# Patient Record
Sex: MALE | Race: WHITE | NOT HISPANIC OR LATINO
[De-identification: names, ages, dates, MRNs, and addresses within clinical notes are randomized per-mention and may not be internally consistent; named-entity substitution may affect disease eponyms.]

---

## 2023-12-19 ENCOUNTER — DASHBOARD ENCOUNTERS (OUTPATIENT)
Age: 48
End: 2023-12-19

## 2023-12-19 ENCOUNTER — OFFICE VISIT (OUTPATIENT)
Dept: URBAN - METROPOLITAN AREA CLINIC 44 | Facility: CLINIC | Age: 48
End: 2023-12-19

## 2023-12-19 ENCOUNTER — CLAIMS CREATED FROM THE CLAIM WINDOW (OUTPATIENT)
Dept: URBAN - METROPOLITAN AREA CLINIC 44 | Facility: CLINIC | Age: 48
End: 2023-12-19

## 2023-12-19 ENCOUNTER — LAB OUTSIDE AN ENCOUNTER (OUTPATIENT)
Dept: URBAN - METROPOLITAN AREA CLINIC 44 | Facility: CLINIC | Age: 48
End: 2023-12-19

## 2023-12-19 VITALS
BODY MASS INDEX: 33.99 KG/M2 | HEART RATE: 69 BPM | WEIGHT: 237.4 LBS | TEMPERATURE: 97.7 F | DIASTOLIC BLOOD PRESSURE: 79 MMHG | SYSTOLIC BLOOD PRESSURE: 123 MMHG | HEIGHT: 70 IN

## 2023-12-19 DIAGNOSIS — K76.0 FATTY LIVER: ICD-10-CM

## 2023-12-19 DIAGNOSIS — K82.4 GALLBLADDER POLYP: ICD-10-CM

## 2023-12-19 DIAGNOSIS — Z12.11 COLON CANCER SCREENING: ICD-10-CM

## 2023-12-19 PROBLEM — 197321007: Status: ACTIVE | Noted: 2023-12-19

## 2023-12-19 PROCEDURE — 99204 OFFICE O/P NEW MOD 45 MIN: CPT | Performed by: INTERNAL MEDICINE

## 2023-12-19 RX ORDER — LEVOTHYROXINE SODIUM 175 UG/1
1 TABLET IN THE MORNING ON AN EMPTY STOMACH TABLET ORAL ONCE A DAY
Status: ACTIVE | COMMUNITY

## 2023-12-19 RX ORDER — ICOSAPENT ETHYL 1000 MG/1
2 CAPSULES WITH MEALS CAPSULE ORAL TWICE A DAY
Status: ACTIVE | COMMUNITY

## 2023-12-19 RX ORDER — FENOFIBRATE 145 MG/1
1 TABLET TABLET ORAL ONCE A DAY
Qty: 90 TABLET | Status: ACTIVE | COMMUNITY

## 2023-12-19 NOTE — HPI-TODAY'S VISIT:
49 yo M presents for an OV for evaluation of an abnormal US that revealed a gallbladder polyp. He had an US due to elevated liver enzyme levels. He mentions a history of LFT elevation and fatty liver disease. He has risk factors of obesity and hyperlipidemia.  He was told he had Hep. C but states that this was later deemed a false positive.  Labs from 9/23: AST 52(nl 40), (46), normal Alk phos and T bilirubin.  Labs from 1/2023 with normal LFTs.  Denies fever, chills or recent infection.  History of COVID-19 on 2/2021 Medication reconciliation: Fenofibrate The patient also has not had a colonoscopy. Denies familial GI disease. Mentions mild changes in bowel habits that are loose but still formed.

## 2024-01-18 ENCOUNTER — TELEPHONE ENCOUNTER (OUTPATIENT)
Dept: URBAN - METROPOLITAN AREA CLINIC 44 | Facility: CLINIC | Age: 49
End: 2024-01-18

## 2024-02-08 ENCOUNTER — COLON (OUTPATIENT)
Dept: URBAN - METROPOLITAN AREA SURGERY CENTER 27 | Facility: SURGERY CENTER | Age: 49
End: 2024-02-08

## 2024-10-18 ENCOUNTER — TELEPHONE ENCOUNTER (OUTPATIENT)
Dept: URBAN - METROPOLITAN AREA CLINIC 44 | Facility: CLINIC | Age: 49
End: 2024-10-18

## 2024-10-18 PROBLEM — 197321007: Status: ACTIVE | Noted: 2024-10-18

## 2024-10-18 PROBLEM — 197433003: Status: ACTIVE | Noted: 2024-10-18

## 2024-10-21 ENCOUNTER — LAB OUTSIDE AN ENCOUNTER (OUTPATIENT)
Dept: URBAN - METROPOLITAN AREA CLINIC 48 | Facility: CLINIC | Age: 49
End: 2024-10-21

## 2024-10-21 ENCOUNTER — OFFICE VISIT (OUTPATIENT)
Dept: URBAN - METROPOLITAN AREA CLINIC 48 | Facility: CLINIC | Age: 49
End: 2024-10-21
Payer: COMMERCIAL

## 2024-10-21 VITALS
WEIGHT: 220.4 LBS | DIASTOLIC BLOOD PRESSURE: 74 MMHG | SYSTOLIC BLOOD PRESSURE: 123 MMHG | TEMPERATURE: 97.3 F | BODY MASS INDEX: 31.55 KG/M2 | HEART RATE: 66 BPM | HEIGHT: 70 IN

## 2024-10-21 DIAGNOSIS — Z12.11 SCREENING FOR COLON CANCER: ICD-10-CM

## 2024-10-21 DIAGNOSIS — K76.0 HEPATIC STEATOSIS: ICD-10-CM

## 2024-10-21 DIAGNOSIS — K82.4 GALLBLADDER POLYP: ICD-10-CM

## 2024-10-21 DIAGNOSIS — K64.8 OTHER HEMORRHOIDS: ICD-10-CM

## 2024-10-21 PROCEDURE — 99204 OFFICE O/P NEW MOD 45 MIN: CPT

## 2024-10-21 RX ORDER — HYDROCORTISONE 25 MG/G
1 APPLICATION CREAM TOPICAL TWICE A DAY
Qty: 1 | Refills: 2 | OUTPATIENT
Start: 2024-10-21 | End: 2024-12-01

## 2024-10-21 RX ORDER — ICOSAPENT ETHYL 1000 MG/1
2 CAPSULES WITH MEALS CAPSULE ORAL TWICE A DAY
Status: ACTIVE | COMMUNITY

## 2024-10-21 RX ORDER — LEVOTHYROXINE SODIUM 175 UG/1
1 TABLET IN THE MORNING ON AN EMPTY STOMACH TABLET ORAL ONCE A DAY
Status: ACTIVE | COMMUNITY

## 2024-10-21 NOTE — PHYSICAL EXAM GASTROINTESTINAL
Abdomen , soft, nontender, nondistended , no guarding or rigidity , no masses palpable , normal bowel sounds Liver and Spleen , no hepatosplenomegaly Rectal multiple non-thrombosed external hemorrhoids; rectal exam performed with chaperone present

## 2024-10-21 NOTE — HPI-TODAY'S VISIT:
49-year-old male presents for evaluation hemorrhoids. He reports his hemorrhoid symptoms began approx 2 weeks ago following traveling and [facility] illness. Hemorrhoids were previously bleeding, but this has resolved. However, he still has perianal burning/stinging with BMs. He has an average of 3 formed BMs daily, and states his bowel habits have normalized after cholecystectomy. He has never had a colonoscopy before. His father had polyps. Patient denies weight loss.  CT abdomen pelvis 1/15/2024 showed 10 mm ovoid hyperdense filling defect along lateral gallbladder wall, possible polyp versus adherent gallstone. No gallbladder wall thickening or pericholecystic inflammation. Abdominal ultrasound 10//23 showed fatty infiltration of liver, 1.1 cm adherent echogenic gallbladder wall focus favored to represent polyp. He had lap uma with unknown provider in [place].  Patient presented to ED 1/23/2024 for chest pain/pressure. Labs showed normal serial troponins, and nuclear stress test was low risk for ischemic heart disease. He reports improved reflux/chest pain and diarrhea following cholecystectomy. Most recent labs 1/15/2024 with elevated ALT 62, AST 49.  Hepatitis testing was negative. Patient states he had labs in 9/2024 which were normal. Denies frequent EtOH use. No known heart, lung, or kidney issues. No pacemaker/defibrillator, home O2, dialysis. He takes aspirin 81mg occasionally, but no other blood thinners. He is borderline diabetic, not on medications.

## 2024-11-11 ENCOUNTER — CLAIMS CREATED FROM THE CLAIM WINDOW (OUTPATIENT)
Dept: URBAN - METROPOLITAN AREA CLINIC 4 | Facility: CLINIC | Age: 49
End: 2024-11-11
Payer: COMMERCIAL

## 2024-11-11 ENCOUNTER — CLAIMS CREATED FROM THE CLAIM WINDOW (OUTPATIENT)
Dept: URBAN - METROPOLITAN AREA SURGERY CENTER 28 | Facility: SURGERY CENTER | Age: 49
End: 2024-11-11
Payer: COMMERCIAL

## 2024-11-11 DIAGNOSIS — D12.0 ADENOMA OF CECUM: ICD-10-CM

## 2024-11-11 DIAGNOSIS — D12.0 BENIGN NEOPLASM OF CECUM: ICD-10-CM

## 2024-11-11 DIAGNOSIS — D12.8 BENIGN NEOPLASM OF RECTUM: ICD-10-CM

## 2024-11-11 DIAGNOSIS — D12.8 ADENOMATOUS POLYP OF RECTUM: ICD-10-CM

## 2024-11-11 DIAGNOSIS — D12.3 ADENOMA OF TRANSVERSE COLON: ICD-10-CM

## 2024-11-11 DIAGNOSIS — Z12.11 COLON CANCER SCREENING: ICD-10-CM

## 2024-11-11 DIAGNOSIS — D12.8 ADENOMATOUS RECTAL POLYP: ICD-10-CM

## 2024-11-11 DIAGNOSIS — Z12.11 ENCOUNTER FOR SCREENING FOR MALIGNANT NEOPLASM OF COLON: ICD-10-CM

## 2024-11-11 PROCEDURE — 45385 COLONOSCOPY W/LESION REMOVAL: CPT | Performed by: INTERNAL MEDICINE

## 2024-11-11 PROCEDURE — 88305 TISSUE EXAM BY PATHOLOGIST: CPT | Performed by: PATHOLOGY

## 2024-11-11 PROCEDURE — 00812 ANES LWR INTST SCR COLSC: CPT | Performed by: ANESTHESIOLOGY

## 2024-11-11 RX ORDER — LEVOTHYROXINE SODIUM 175 UG/1
1 TABLET IN THE MORNING ON AN EMPTY STOMACH TABLET ORAL ONCE A DAY
Status: ACTIVE | COMMUNITY

## 2024-11-11 RX ORDER — HYDROCORTISONE 25 MG/G
1 APPLICATION CREAM TOPICAL TWICE A DAY
Qty: 1 | Refills: 2 | Status: ACTIVE | COMMUNITY
Start: 2024-10-21 | End: 2024-12-01

## 2024-11-11 RX ORDER — ICOSAPENT ETHYL 1000 MG/1
2 CAPSULES WITH MEALS CAPSULE ORAL TWICE A DAY
Status: ACTIVE | COMMUNITY

## 2024-11-13 ENCOUNTER — OFFICE VISIT (OUTPATIENT)
Dept: URBAN - METROPOLITAN AREA CLINIC 48 | Facility: CLINIC | Age: 49
End: 2024-11-13